# Patient Record
Sex: FEMALE | Race: WHITE | NOT HISPANIC OR LATINO
[De-identification: names, ages, dates, MRNs, and addresses within clinical notes are randomized per-mention and may not be internally consistent; named-entity substitution may affect disease eponyms.]

---

## 2017-01-03 ENCOUNTER — APPOINTMENT (OUTPATIENT)
Dept: ULTRASOUND IMAGING | Facility: HOSPITAL | Age: 72
End: 2017-01-03

## 2017-01-03 ENCOUNTER — OUTPATIENT (OUTPATIENT)
Dept: OUTPATIENT SERVICES | Facility: HOSPITAL | Age: 72
LOS: 1 days | End: 2017-01-03
Payer: MEDICARE

## 2017-01-03 ENCOUNTER — APPOINTMENT (OUTPATIENT)
Dept: ULTRASOUND IMAGING | Facility: IMAGING CENTER | Age: 72
End: 2017-01-03

## 2017-01-03 ENCOUNTER — TRANSCRIPTION ENCOUNTER (OUTPATIENT)
Age: 72
End: 2017-01-03

## 2017-01-03 PROCEDURE — 76700 US EXAM ABDOM COMPLETE: CPT | Mod: 26

## 2017-01-03 PROCEDURE — 76775 US EXAM ABDO BACK WALL LIM: CPT | Mod: 26

## 2017-01-03 PROCEDURE — 76700 US EXAM ABDOM COMPLETE: CPT

## 2017-01-03 PROCEDURE — 76775 US EXAM ABDO BACK WALL LIM: CPT

## 2017-03-21 ENCOUNTER — APPOINTMENT (OUTPATIENT)
Dept: PULMONOLOGY | Facility: CLINIC | Age: 72
End: 2017-03-21

## 2017-03-21 VITALS
WEIGHT: 100 LBS | OXYGEN SATURATION: 99 % | SYSTOLIC BLOOD PRESSURE: 120 MMHG | DIASTOLIC BLOOD PRESSURE: 70 MMHG | RESPIRATION RATE: 14 BRPM | BODY MASS INDEX: 17.07 KG/M2 | HEART RATE: 95 BPM | HEIGHT: 64 IN

## 2017-03-22 ENCOUNTER — OUTPATIENT (OUTPATIENT)
Dept: OUTPATIENT SERVICES | Facility: HOSPITAL | Age: 72
LOS: 1 days | End: 2017-03-22
Payer: MEDICARE

## 2017-03-22 PROCEDURE — 93970 EXTREMITY STUDY: CPT

## 2017-03-22 PROCEDURE — 93880 EXTRACRANIAL BILAT STUDY: CPT

## 2017-03-22 PROCEDURE — 93880 EXTRACRANIAL BILAT STUDY: CPT | Mod: 26

## 2017-03-22 PROCEDURE — 93970 EXTREMITY STUDY: CPT | Mod: 26

## 2017-03-23 LAB
DEPRECATED D DIMER PPP IA-ACNC: <150 NG/ML DDU
NT-PROBNP SERPL-MCNC: 100 PG/ML

## 2017-03-24 ENCOUNTER — RESULT REVIEW (OUTPATIENT)
Age: 72
End: 2017-03-24

## 2017-06-01 ENCOUNTER — OUTPATIENT (OUTPATIENT)
Dept: OUTPATIENT SERVICES | Facility: HOSPITAL | Age: 72
LOS: 1 days | End: 2017-06-01
Payer: MEDICARE

## 2017-06-01 ENCOUNTER — APPOINTMENT (OUTPATIENT)
Dept: CT IMAGING | Facility: HOSPITAL | Age: 72
End: 2017-06-01

## 2017-06-01 PROCEDURE — 71250 CT THORAX DX C-: CPT

## 2017-06-02 ENCOUNTER — RESULT REVIEW (OUTPATIENT)
Age: 72
End: 2017-06-02

## 2017-07-10 ENCOUNTER — APPOINTMENT (OUTPATIENT)
Dept: PULMONOLOGY | Facility: CLINIC | Age: 72
End: 2017-07-10

## 2017-07-10 VITALS
DIASTOLIC BLOOD PRESSURE: 80 MMHG | BODY MASS INDEX: 17.07 KG/M2 | SYSTOLIC BLOOD PRESSURE: 120 MMHG | RESPIRATION RATE: 16 BRPM | HEART RATE: 86 BPM | OXYGEN SATURATION: 100 % | HEIGHT: 64 IN | WEIGHT: 100 LBS

## 2017-07-10 DIAGNOSIS — R06.09 OTHER FORMS OF DYSPNEA: ICD-10-CM

## 2017-07-10 RX ORDER — PREDNISONE 10 MG/1
10 TABLET ORAL
Qty: 1 | Refills: 0 | Status: ACTIVE | COMMUNITY
Start: 2017-07-10 | End: 1900-01-01

## 2017-07-10 RX ORDER — RANITIDINE HCL 75 MG
400-400-40 TABLET ORAL
Qty: 355 | Refills: 0 | Status: ACTIVE | COMMUNITY
Start: 2017-01-02

## 2017-07-17 ENCOUNTER — APPOINTMENT (OUTPATIENT)
Dept: PULMONOLOGY | Facility: CLINIC | Age: 72
End: 2017-07-17

## 2017-07-17 VITALS
BODY MASS INDEX: 17.07 KG/M2 | WEIGHT: 100 LBS | HEART RATE: 94 BPM | DIASTOLIC BLOOD PRESSURE: 65 MMHG | SYSTOLIC BLOOD PRESSURE: 120 MMHG | HEIGHT: 64 IN | RESPIRATION RATE: 14 BRPM | OXYGEN SATURATION: 100 %

## 2017-07-17 RX ORDER — ALBUTEROL SULFATE 2.5 MG/3ML
(2.5 MG/3ML) SOLUTION RESPIRATORY (INHALATION)
Qty: 120 | Refills: 2 | Status: ACTIVE | COMMUNITY
Start: 2017-07-17 | End: 1900-01-01

## 2017-07-17 RX ORDER — BECLOMETHASONE DIPROPIONATE 80 UG/1
80 AEROSOL, METERED RESPIRATORY (INHALATION) TWICE DAILY
Qty: 3 | Refills: 1 | Status: ACTIVE | COMMUNITY
Start: 2017-07-17 | End: 1900-01-01

## 2017-08-14 ENCOUNTER — APPOINTMENT (OUTPATIENT)
Dept: PULMONOLOGY | Facility: CLINIC | Age: 72
End: 2017-08-14

## 2017-10-16 ENCOUNTER — APPOINTMENT (OUTPATIENT)
Dept: PULMONOLOGY | Facility: CLINIC | Age: 72
End: 2017-10-16
Payer: MEDICARE

## 2017-10-16 VITALS
HEIGHT: 63 IN | DIASTOLIC BLOOD PRESSURE: 70 MMHG | HEART RATE: 72 BPM | BODY MASS INDEX: 17.19 KG/M2 | WEIGHT: 97 LBS | SYSTOLIC BLOOD PRESSURE: 116 MMHG | RESPIRATION RATE: 14 BRPM | OXYGEN SATURATION: 99 %

## 2017-10-16 PROCEDURE — 99214 OFFICE O/P EST MOD 30 MIN: CPT | Mod: 25

## 2017-10-16 PROCEDURE — 94010 BREATHING CAPACITY TEST: CPT

## 2017-10-16 RX ORDER — BECLOMETHASONE DIPROPIONATE 40 UG/1
40 AEROSOL, METERED RESPIRATORY (INHALATION)
Qty: 3 | Refills: 1 | Status: ACTIVE | COMMUNITY
Start: 2017-10-16 | End: 1900-01-01

## 2018-01-08 ENCOUNTER — APPOINTMENT (OUTPATIENT)
Dept: PULMONOLOGY | Facility: CLINIC | Age: 73
End: 2018-01-08

## 2018-01-16 ENCOUNTER — OUTPATIENT (OUTPATIENT)
Dept: OUTPATIENT SERVICES | Facility: HOSPITAL | Age: 73
LOS: 1 days | End: 2018-01-16
Payer: MEDICARE

## 2018-01-16 ENCOUNTER — APPOINTMENT (OUTPATIENT)
Dept: MRI IMAGING | Facility: HOSPITAL | Age: 73
End: 2018-01-16
Payer: MEDICARE

## 2018-01-16 DIAGNOSIS — Z00.8 ENCOUNTER FOR OTHER GENERAL EXAMINATION: ICD-10-CM

## 2018-01-16 PROCEDURE — 72141 MRI NECK SPINE W/O DYE: CPT

## 2018-01-16 PROCEDURE — 72141 MRI NECK SPINE W/O DYE: CPT | Mod: 26

## 2018-02-16 ENCOUNTER — APPOINTMENT (OUTPATIENT)
Dept: PULMONOLOGY | Facility: CLINIC | Age: 73
End: 2018-02-16
Payer: MEDICARE

## 2018-02-16 VITALS
RESPIRATION RATE: 17 BRPM | DIASTOLIC BLOOD PRESSURE: 66 MMHG | HEIGHT: 63 IN | OXYGEN SATURATION: 96 % | SYSTOLIC BLOOD PRESSURE: 100 MMHG | WEIGHT: 97 LBS | BODY MASS INDEX: 17.19 KG/M2 | HEART RATE: 76 BPM

## 2018-02-16 PROCEDURE — 94618 PULMONARY STRESS TESTING: CPT

## 2018-02-16 PROCEDURE — 94010 BREATHING CAPACITY TEST: CPT | Mod: 59

## 2018-02-16 PROCEDURE — 99214 OFFICE O/P EST MOD 30 MIN: CPT | Mod: 25

## 2018-02-16 RX ORDER — ALBUTEROL SULFATE 2.5 MG/3ML
(2.5 MG/3ML) SOLUTION RESPIRATORY (INHALATION)
Qty: 120 | Refills: 5 | Status: ACTIVE | OUTPATIENT
Start: 2018-02-16

## 2018-02-16 RX ORDER — AZELASTINE HYDROCHLORIDE 205.5 UG/1
0.15 SPRAY, METERED NASAL TWICE DAILY
Qty: 3 | Refills: 1 | Status: ACTIVE | COMMUNITY
Start: 2018-02-16 | End: 1900-01-01

## 2018-04-16 ENCOUNTER — APPOINTMENT (OUTPATIENT)
Dept: RHEUMATOLOGY | Facility: CLINIC | Age: 73
End: 2018-04-16

## 2018-05-25 ENCOUNTER — NON-APPOINTMENT (OUTPATIENT)
Age: 73
End: 2018-05-25

## 2018-05-25 ENCOUNTER — APPOINTMENT (OUTPATIENT)
Dept: PULMONOLOGY | Facility: CLINIC | Age: 73
End: 2018-05-25
Payer: MEDICARE

## 2018-05-25 VITALS
HEIGHT: 63 IN | DIASTOLIC BLOOD PRESSURE: 60 MMHG | WEIGHT: 97 LBS | OXYGEN SATURATION: 99 % | SYSTOLIC BLOOD PRESSURE: 110 MMHG | HEART RATE: 77 BPM | BODY MASS INDEX: 17.19 KG/M2

## 2018-05-25 PROCEDURE — 99214 OFFICE O/P EST MOD 30 MIN: CPT | Mod: 25

## 2018-05-25 PROCEDURE — 95012 NITRIC OXIDE EXP GAS DETER: CPT

## 2018-05-25 PROCEDURE — 94010 BREATHING CAPACITY TEST: CPT

## 2018-05-25 RX ORDER — SUCRALFATE 1 G/1
1 TABLET ORAL
Qty: 360 | Refills: 0 | Status: ACTIVE | COMMUNITY
Start: 2018-05-11

## 2018-05-25 RX ORDER — LOSARTAN POTASSIUM 50 MG/1
50 TABLET, FILM COATED ORAL
Qty: 180 | Refills: 0 | Status: ACTIVE | COMMUNITY
Start: 2018-04-21

## 2018-06-11 ENCOUNTER — APPOINTMENT (OUTPATIENT)
Dept: CT IMAGING | Facility: HOSPITAL | Age: 73
End: 2018-06-11
Payer: MEDICARE

## 2018-06-11 ENCOUNTER — OUTPATIENT (OUTPATIENT)
Dept: OUTPATIENT SERVICES | Facility: HOSPITAL | Age: 73
LOS: 1 days | End: 2018-06-11
Payer: MEDICARE

## 2018-06-11 DIAGNOSIS — R93.8 ABNORMAL FINDINGS ON DIAGNOSTIC IMAGING OF OTHER SPECIFIED BODY STRUCTURES: ICD-10-CM

## 2018-06-11 PROCEDURE — 71250 CT THORAX DX C-: CPT | Mod: 26

## 2018-06-11 PROCEDURE — 71250 CT THORAX DX C-: CPT

## 2018-08-06 ENCOUNTER — RESULT REVIEW (OUTPATIENT)
Age: 73
End: 2018-08-06

## 2018-09-25 ENCOUNTER — APPOINTMENT (OUTPATIENT)
Dept: PULMONOLOGY | Facility: CLINIC | Age: 73
End: 2018-09-25

## 2018-10-29 ENCOUNTER — APPOINTMENT (OUTPATIENT)
Dept: PULMONOLOGY | Facility: CLINIC | Age: 73
End: 2018-10-29
Payer: MEDICARE

## 2018-10-29 ENCOUNTER — NON-APPOINTMENT (OUTPATIENT)
Age: 73
End: 2018-10-29

## 2018-10-29 VITALS
WEIGHT: 95 LBS | SYSTOLIC BLOOD PRESSURE: 96 MMHG | OXYGEN SATURATION: 93 % | DIASTOLIC BLOOD PRESSURE: 60 MMHG | BODY MASS INDEX: 16.22 KG/M2 | RESPIRATION RATE: 17 BRPM | HEIGHT: 64 IN | HEART RATE: 80 BPM

## 2018-10-29 PROCEDURE — 94010 BREATHING CAPACITY TEST: CPT

## 2018-10-29 PROCEDURE — 99214 OFFICE O/P EST MOD 30 MIN: CPT | Mod: 25

## 2018-10-29 NOTE — PHYSICAL EXAM
[General Appearance - Well Developed] : well developed [Normal Appearance] : normal appearance [Well Groomed] : well groomed [General Appearance - Well Nourished] : well nourished [No Deformities] : no deformities [General Appearance - In No Acute Distress] : no acute distress [Normal Conjunctiva] : the conjunctiva exhibited no abnormalities [Eyelids - No Xanthelasma] : the eyelids demonstrated no xanthelasmas [Normal Oropharynx] : normal oropharynx [II] : II [Neck Appearance] : the appearance of the neck was normal [Neck Cervical Mass (___cm)] : no neck mass was observed [Jugular Venous Distention Increased] : there was no jugular-venous distention [Thyroid Diffuse Enlargement] : the thyroid was not enlarged [Thyroid Nodule] : there were no palpable thyroid nodules [Heart Rate And Rhythm] : heart rate and rhythm were normal [Heart Sounds] : normal S1 and S2 [Murmurs] : no murmurs present [Respiration, Rhythm And Depth] : normal respiratory rhythm and effort [Exaggerated Use Of Accessory Muscles For Inspiration] : no accessory muscle use [Auscultation Breath Sounds / Voice Sounds] : lungs were clear to auscultation bilaterally [Abdomen Soft] : soft [Abdomen Tenderness] : non-tender [Abdomen Mass (___ Cm)] : no abdominal mass palpated [Abnormal Walk] : normal gait [Gait - Sufficient For Exercise Testing] : the gait was sufficient for exercise testing [Nail Clubbing] : no clubbing of the fingernails [Cyanosis, Localized] : no localized cyanosis [Petechial Hemorrhages (___cm)] : no petechial hemorrhages [Skin Color & Pigmentation] : normal skin color and pigmentation [] : no rash [No Venous Stasis] : no venous stasis [Skin Lesions] : no skin lesions [No Skin Ulcers] : no skin ulcer [No Xanthoma] : no  xanthoma was observed [Deep Tendon Reflexes (DTR)] : deep tendon reflexes were 2+ and symmetric [Sensation] : the sensory exam was normal to light touch and pinprick [No Focal Deficits] : no focal deficits [Oriented To Time, Place, And Person] : oriented to person, place, and time [Impaired Insight] : insight and judgment were intact [Affect] : the affect was normal [FreeTextEntry1] : I:E 1:3; clear

## 2018-10-29 NOTE — REASON FOR VISIT
[Follow-Up] : a follow-up visit [FreeTextEntry1] : abnormal chest CT, allergies, asthma, GERD, ESTELLE, and poor sleep

## 2018-10-29 NOTE — ASSESSMENT
[FreeTextEntry1] : Ms. Rodriguez is a 72 year old female with a history of asthma, allergies, GERD, and poor sleep.\par Her SOB is due to: \par -CAD or arrhythmias (she is to see her cardiologist) \par -asthma\par -anemia \par \par problem 1: moderate persistent asthma\par -continue Nebulizer with Albuterol QD, prn \par -continue Advair 500 1 puff BID \par -followed by Spiriva 1 inhalation QD\par -continue Qvar 80 2 puffs BID prn \par -continue to use Singulair 10 mg BID\par -Asthma is  believed to be caused by inherited (genetic) and environmental factor, but its exact cause is unknown. Asthma may be triggered by allergens, lung infections, or irritants in the air. Asthma triggers are different for each person \par -Inhaler technique reviewed as well as oral hygiene techniques reviewed with patient. Avoidance of cold air, extremes of temperature, rescue inhaler should be used before exercise. Order of medication reviewed with patient. Recommended use of a cool mist humidifier in the bedroom. \par \par problem 2: allergies and sinuses\par -continue to use Zyrtec 10 mg before bed\par -continue to use nasal saline\par -continue Astelin 0.15% at 1 sniff/nostril BID \par -Appointment with Dr. Otero\par -Environmental measures for allergies were encouraged including mattress and pillow cover, air purifier, and environmental controls. \par \par problem 3: GERD\par -continue to use Zantac 150 mg BID\par -Rule of 2s: avoid eating too much, eating too late, eating too spicy, eating two hours before bed\par -Things to avoid including overeating, spicy foods, tight clothing, eating within three hours of bed, this list is not all inclusive. \par -For treatment of reflux, possible options discussed including diet control, H2 blockers, PPIs, as well as coating motility agents discussed as treatment options. Timing of meals and proximity of last meal to sleep were discussed. If symptoms persist, a formal gastrointestinal evaluation is needed. \par \par problem 4: low vitamin D level\par -continue to use supplemental vitamin D\par - Has been associated with asthma exacerbations and increased allergic symptoms. The goal based on recent information is maintaining levels between 50-70 and low normal is 30. Recommended 50,000 units every two weeks to once a month depending on the level. \par \par problem 5: poor sleep and leg pains \par -she is being recommended to try MyoCalm PM \par \par problem 6: history of an abnormal chest CT\par -follow up chest CT in June 2019 \par \par problem 7: cardiac component\par -continue to f/u with Dr. Swanson\par -pending stress echocardiogram\par \par Problem 8: r/o OSAS\par -she is to have a home sleep study to r/o OSAS based on her fatigue, EDS, snoring, and elevated mallampati class\par -recommended Sleep Guard by nedra \par - -Discussed the risks/associations with coronary artery disease, atrial fibrillation, arrhythmia, memory loss, issues with concentration, stroke risk, hypertension, nocturia, chronic reflux/Dumont’s esophagus some but not all inclusive. Treatment options discussed including CPAP/BiPAP machine, oral appliance, ProVent therapy, Oxy-Aid by Respitec, new technologies, or positional sleep. \par \par problem 9: health maintenance \par -recommended IBS Guard \par -recommended yearly flu shot after October 15\par -recommended strep pneumonia vaccines: Prevnar-13 vaccine, followed by Pneumo vaccine 23 one year following\par -recommended early intervention for URIs\par -recommended regular osteoporosis evaluations\par -recommended early dermatological evaluations\par -recommended after the age of 50 to the age of 70, colonoscopy every 5 years \par \par F/U in 3-4 months\par She is encouraged to call with any changes, concerns, or questions

## 2018-10-29 NOTE — PROCEDURE
[FreeTextEntry1] : PFT- spi reveals normal flows; FEV1 was 2.08L which is 96% of predicted; normal flow volume loop \par \par She had a CT chest scan performed on 6/12/2018, which showed right lower lobe 3 mm groundglass nodule that is new from the prior study. A few other bilateral 3 mm nodule that are unchanged. Three month follow up CT recommended.

## 2018-10-29 NOTE — HISTORY OF PRESENT ILLNESS
[FreeTextEntry1] : Ms. Rodriguez is a 72 year old female presenting to the office today for a follow up visit for abnormal chest CT, allergies, asthma, GERD, ESTELLE, and poor sleep. Her chief complaint is very poor sleep. \par - She comes in stating that she is having a lot of difficulty with her sleeping/\par - She states that her sleep is terrible. She is waking up every 1-1.5 hours.\par - She has severe anxiety. She believes that her sleeping is secondary to her anxiety. She is moving to [place] and is having a lot anxiety about the move. \par - She notes occasional SOB upon exertion (stairs, washing the floor)\par - She states that if she gets 4 hours of sleep that is a lot. \par - She states that she lost weight, which is normal for her during the summer\par - She has a history of issues with her back. She states that it has recently worsened.

## 2018-10-29 NOTE — ADDENDUM
[FreeTextEntry1] : Documented by Lavon Tobin acting as a scribe for Dr. Ady Emmanuel on 10/29/18\par \par All medical record entries made by the Scribe were at my, Dr. Ady Emmanuel's, direction and personally dictated by me on 10/29/18. I have reviewed the chart and agree that the record accurately reflects my personal performance of the history, physical exam, assessment and plan. I have also personally directed, reviewed, and agree with the discharge instructions. \par \par \par \par \par

## 2019-01-09 ENCOUNTER — APPOINTMENT (OUTPATIENT)
Dept: PULMONOLOGY | Facility: CLINIC | Age: 74
End: 2019-01-09
Payer: MEDICARE

## 2019-01-09 ENCOUNTER — MEDICATION RENEWAL (OUTPATIENT)
Age: 74
End: 2019-01-09

## 2019-01-09 VITALS
RESPIRATION RATE: 14 BRPM | DIASTOLIC BLOOD PRESSURE: 60 MMHG | SYSTOLIC BLOOD PRESSURE: 110 MMHG | HEART RATE: 82 BPM | WEIGHT: 96 LBS | HEIGHT: 64 IN | OXYGEN SATURATION: 100 % | BODY MASS INDEX: 16.39 KG/M2

## 2019-01-09 DIAGNOSIS — R07.89 OTHER CHEST PAIN: ICD-10-CM

## 2019-01-09 PROCEDURE — 94640 AIRWAY INHALATION TREATMENT: CPT

## 2019-01-09 PROCEDURE — 71046 X-RAY EXAM CHEST 2 VIEWS: CPT

## 2019-01-09 PROCEDURE — 99214 OFFICE O/P EST MOD 30 MIN: CPT | Mod: 25

## 2019-01-09 RX ORDER — ALBUTEROL SULFATE 2.5 MG/3ML
(2.5 MG/3ML) SOLUTION RESPIRATORY (INHALATION)
Qty: 120 | Refills: 3 | Status: ACTIVE | OUTPATIENT
Start: 2019-01-09

## 2019-01-09 RX ORDER — PREDNISONE 10 MG/1
10 TABLET ORAL
Qty: 50 | Refills: 0 | Status: ACTIVE | COMMUNITY
Start: 2019-01-09 | End: 1900-01-01

## 2019-01-15 RX ORDER — BUDESONIDE 0.5 MG/2ML
0.5 INHALANT ORAL
Qty: 2 | Refills: 1 | Status: ACTIVE | COMMUNITY
Start: 2019-01-15 | End: 1900-01-01

## 2019-01-15 RX ORDER — PREDNISONE 10 MG/1
10 TABLET ORAL
Qty: 21 | Refills: 0 | Status: ACTIVE | COMMUNITY
Start: 2019-01-15 | End: 1900-01-01

## 2019-01-17 NOTE — PHYSICAL EXAM
[General Appearance - Well Developed] : well developed [Normal Appearance] : normal appearance [Well Groomed] : well groomed [General Appearance - Well Nourished] : well nourished [No Deformities] : no deformities [General Appearance - In No Acute Distress] : no acute distress [Normal Conjunctiva] : the conjunctiva exhibited no abnormalities [Eyelids - No Xanthelasma] : the eyelids demonstrated no xanthelasmas [Normal Oropharynx] : normal oropharynx [III] : III [Neck Appearance] : the appearance of the neck was normal [Neck Cervical Mass (___cm)] : no neck mass was observed [Jugular Venous Distention Increased] : there was no jugular-venous distention [Thyroid Diffuse Enlargement] : the thyroid was not enlarged [Thyroid Nodule] : there were no palpable thyroid nodules [Heart Rate And Rhythm] : heart rate and rhythm were normal [Heart Sounds] : normal S1 and S2 [Murmurs] : no murmurs present [Respiration, Rhythm And Depth] : normal respiratory rhythm and effort [Exaggerated Use Of Accessory Muscles For Inspiration] : no accessory muscle use [Auscultation Breath Sounds / Voice Sounds] : lungs were clear to auscultation bilaterally [Abdomen Soft] : soft [Abdomen Tenderness] : non-tender [Abdomen Mass (___ Cm)] : no abdominal mass palpated [Abnormal Walk] : normal gait [Gait - Sufficient For Exercise Testing] : the gait was sufficient for exercise testing [Nail Clubbing] : no clubbing of the fingernails [Cyanosis, Localized] : no localized cyanosis [Petechial Hemorrhages (___cm)] : no petechial hemorrhages [Skin Color & Pigmentation] : normal skin color and pigmentation [] : no rash [No Venous Stasis] : no venous stasis [Skin Lesions] : no skin lesions [No Skin Ulcers] : no skin ulcer [No Xanthoma] : no  xanthoma was observed [Deep Tendon Reflexes (DTR)] : deep tendon reflexes were 2+ and symmetric [Sensation] : the sensory exam was normal to light touch and pinprick [No Focal Deficits] : no focal deficits [Oriented To Time, Place, And Person] : oriented to person, place, and time [Impaired Insight] : insight and judgment were intact [Affect] : the affect was normal [FreeTextEntry1] : I:E 1:3; clear

## 2019-01-17 NOTE — PROCEDURE
[FreeTextEntry1] : CXR reveals a normal sized heart; hyperinflation bilaterally - questionable inflammatory changes left mid lung however not seen on lateral chest radiograph. \par \par Chest CT  06.11.18 reveals right lower lobe 3mm groundglass nodule is new from the prior study. A few other bilateral 3mm nodules unchanged. Three-month follow-up CT recommended.

## 2019-01-17 NOTE — REASON FOR VISIT
[Follow-Up] : a follow-up visit [FreeTextEntry1] : sick visit abnormal chest CT, allergies, asthma, GERD, ESTELLE, and poor sleep

## 2019-01-17 NOTE — REVIEW OF SYSTEMS
[Negative] : Sleep Disorder [As Noted in HPI] : as noted in HPI [Dyspnea] : dyspnea [FreeTextEntry8] : chest pressure

## 2019-01-17 NOTE — ADDENDUM
[FreeTextEntry1] : Documented by Shankar Jaquez acting as a scribe for Dr. Ady Emmanuel on 01/09/2019.\par \par All medical record entries made by the Scribe were at my, Dr. Ady Emmanuel's, direction and personally dictated by me on 01/09/2019. I have reviewed the chart and agree that the record accurately reflects my personal performance of the history, physical exam, assessment and plan. I have also personally directed, reviewed, and agree with the discharge instructions.

## 2019-01-17 NOTE — HISTORY OF PRESENT ILLNESS
[FreeTextEntry1] : Ms. Rodriguez is a 72 year old female presenting to the office today for a follow up visit for abnormal chest CT, allergies, asthma, GERD, ESTELLE, and poor sleep. Her chief complaint is chest pressure\par -she states she has never had breast surgery\par -she notes her symptoms started 3 weeks ago\par -she notes she had aches, nausea, loss off appetite, difficulty breathing, congested, and chest discomfort with walks.\par -she states she finished Zpack 2 days ago, which did not alleviate any symptoms\par -she states she is currently coughing\par -she states she recently saw Dr. See and negative for influenza\par -she notes she has been compliant with inhalers\par -she denies any wheezing, headaches, nausea, vomiting, fever, chills, sweats, chest pain, chest pressure, diarrhea, constipation, dysphagia, dizziness, leg swelling, leg pain, itchy eyes, itchy ears, heartburn, reflux, or sour taste in the mouth.

## 2019-02-20 ENCOUNTER — APPOINTMENT (OUTPATIENT)
Dept: CT IMAGING | Facility: HOSPITAL | Age: 74
End: 2019-02-20

## 2019-02-28 ENCOUNTER — APPOINTMENT (OUTPATIENT)
Dept: PULMONOLOGY | Facility: CLINIC | Age: 74
End: 2019-02-28
Payer: MEDICARE

## 2019-02-28 ENCOUNTER — NON-APPOINTMENT (OUTPATIENT)
Age: 74
End: 2019-02-28

## 2019-02-28 VITALS
HEIGHT: 64 IN | WEIGHT: 97 LBS | OXYGEN SATURATION: 98 % | SYSTOLIC BLOOD PRESSURE: 90 MMHG | BODY MASS INDEX: 16.56 KG/M2 | DIASTOLIC BLOOD PRESSURE: 60 MMHG | RESPIRATION RATE: 17 BRPM | TEMPERATURE: 99.6 F | HEART RATE: 79 BPM

## 2019-02-28 DIAGNOSIS — J45.901 ACUTE BRONCHITIS, UNSPECIFIED: ICD-10-CM

## 2019-02-28 DIAGNOSIS — J20.9 ACUTE BRONCHITIS, UNSPECIFIED: ICD-10-CM

## 2019-02-28 PROCEDURE — 94010 BREATHING CAPACITY TEST: CPT

## 2019-02-28 PROCEDURE — 95012 NITRIC OXIDE EXP GAS DETER: CPT

## 2019-02-28 PROCEDURE — 99214 OFFICE O/P EST MOD 30 MIN: CPT | Mod: 25

## 2019-02-28 RX ORDER — SPIRONOLACTONE 50 MG/1
50 TABLET ORAL
Qty: 90 | Refills: 0 | Status: ACTIVE | COMMUNITY
Start: 2019-01-09

## 2019-02-28 RX ORDER — DESVENLAFAXINE 25 MG/1
25 TABLET, EXTENDED RELEASE ORAL
Qty: 30 | Refills: 0 | Status: ACTIVE | COMMUNITY
Start: 2019-01-18

## 2019-02-28 RX ORDER — METOPROLOL SUCCINATE 25 MG/1
25 TABLET, EXTENDED RELEASE ORAL
Qty: 30 | Refills: 0 | Status: ACTIVE | COMMUNITY
Start: 2019-01-28

## 2019-02-28 RX ORDER — CLONAZEPAM 0.12 MG/1
0.12 TABLET, ORALLY DISINTEGRATING ORAL
Qty: 30 | Refills: 0 | Status: ACTIVE | COMMUNITY
Start: 2019-01-30

## 2019-02-28 RX ORDER — METOPROLOL SUCCINATE 50 MG/1
50 TABLET, EXTENDED RELEASE ORAL
Qty: 90 | Refills: 0 | Status: ACTIVE | COMMUNITY
Start: 2019-02-04

## 2019-02-28 RX ORDER — CLONAZEPAM 0.5 MG/1
0.5 TABLET ORAL
Qty: 60 | Refills: 0 | Status: ACTIVE | COMMUNITY
Start: 2019-01-28

## 2019-02-28 RX ORDER — CHLORHEXIDINE GLUCONATE, 0.12% ORAL RINSE 1.2 MG/ML
0.12 SOLUTION DENTAL
Qty: 946 | Refills: 0 | Status: ACTIVE | COMMUNITY
Start: 2019-02-18

## 2019-02-28 RX ORDER — SUCRALFATE 1 G/10ML
1 SUSPENSION ORAL
Qty: 1200 | Refills: 0 | Status: ACTIVE | COMMUNITY
Start: 2019-01-31

## 2019-02-28 RX ORDER — TRAZODONE HYDROCHLORIDE 150 MG/1
150 TABLET ORAL
Qty: 60 | Refills: 0 | Status: ACTIVE | COMMUNITY
Start: 2019-02-15

## 2019-02-28 NOTE — REASON FOR VISIT
[Follow-Up] : a follow-up visit [FreeTextEntry1] : abnormal lung CT, allergic rhinitis, asthma, PINTO, GERD, lung nodules, ESTELLE, poor sleep and SOB

## 2019-02-28 NOTE — ADDENDUM
[FreeTextEntry1] : All medical record entries made by suhas Lawrence were at Dr. Ady Emmanule's, direction and personally dictated by me on 02/28/2019. I have reviewed the chart and agree that the record accurately reflects my personal performance of the history, physical exam, assessment and plan. I have also personally directed, reviewed, and agree with the discharge instructions.

## 2019-02-28 NOTE — PROCEDURE
[FreeTextEntry1] : PFT - spi reveals normal flows; FEV1 is 2.16 which is 101% of predicted, normal flow volume loop \par \par FENO was 7; a normal value being less than 25\par \par Fractional exhaled nitric oxide (FENO) is regarded as a simple, noninvasive method for assessing eosinophilic airway inflammation. Produced by a variety of cells within the lung, nitric oxide (NO) concentrations are generally low in healthy individuals. However, high concentrations of NO appear to be involved in nonspecific host defense mechanisms and chronic inflammatory diseases such as asthma. The American Thoracic Society (ATS) therefore has recommended using FENO to aid in the diagnosis and monitoring of eosinophilic airway inflammation and asthma, and for identifying steroid responsive individuals whose chronic respiratory symptoms may be caused by airway inflammation.

## 2019-02-28 NOTE — ASSESSMENT
[FreeTextEntry1] : Ms. Rodriguez is a 72 year old female with a history of asthma, allergies, GERD, poor sleep and abnormal CT presenting under stress due to moving houses. \par \par Her SOB is due to: \par -CAD or arrhythmias (she is to see her cardiologist) \par -asthma (active )\par -anemia \par \par problem 1: asthma\par -continue Nebulizer with Budesonide 0.5% BID, Q6H - treatment given today in office\par -continue Advair 500 1 puff BID \par -followed by Spiriva 1 inhalation QD\par -continue Qvar 80 2 puffs BID prn \par -continue to use Singulair 10 mg BID\par \par -Asthma is  believed to be caused by inherited (genetic) and environmental factor, but its exact cause is unknown. Asthma may be triggered by allergens, lung infections, or irritants in the air. Asthma triggers are different for each person \par -Inhaler technique reviewed as well as oral hygiene techniques reviewed with patient. Avoidance of cold air, extremes of temperature, rescue inhaler should be used before exercise. Order of medication reviewed with patient. Recommended use of a cool mist humidifier in the bedroom. \par - Information sheet given to the patient to be reviewed, this medication is never to be used without\par consulting the prescribing physician. Proper dietary restraint is necessary specifically salt containing\par foods, if any reaction may occur should be reported.  \par \par problem 2: allergy sinus\par -ENT f/u with Dr. Otero\par -continue to use Zyrtec 10 mg before bed\par -continue to use nasal saline\par -continue Astelin 0.15% at 1 sniff/nostril BID \par -Appointment with Dr. Otero\par -Environmental measures for allergies were encouraged including mattress and pillow cover, air purifier, and environmental controls. \par \par problem 3: GERD\par -continue to use Zantac 150 mg BID\par -recommended to use Organic Apple Cider Vinegar Tablets \par -recommended to continue to follow up with gastroenterologists - Dr. Wes Brar & Dr. ARTURO Swanson\par \par -Rule of 2s: avoid eating too much, eating too late, eating too spicy, eating two hours before bed\par -Things to avoid including overeating, spicy foods, tight clothing, eating within three hours of bed, this list is not all inclusive. \par -For treatment of reflux, possible options discussed including diet control, H2 blockers, PPIs, as well as coating motility agents discussed as treatment options. Timing of meals and proximity of last meal to sleep were discussed. If symptoms persist, a formal gastrointestinal evaluation is needed. \par \par problem 4: low vitamin D level\par -continue to use supplemental vitamin D\par - Has been associated with asthma exacerbations and increased allergic symptoms. The goal based on recent information is maintaining levels between 50-70 and low normal is 30. Recommended 50,000 units every two weeks to once a month depending on the level. \par \par problem 5: poor sleep and leg pains \par -she is being recommended to try MyoCalm PM \par \par problem 6: history of an abnormal chest CT (nodule) - new\par -follow up chest CT now (next 3/19\par \par problem 7: cardiac component\par -continue to f/u with Dr. Swanson\par -pending stress echocardiogram\par \par Problem 8: r/o OSAS\par -she is to have a home sleep study to r/o OSAS based on her fatigue, EDS, snoring, and elevated mallampati class\par -recommended Sleep Guard by nedra \par - -Discussed the risks/associations with coronary artery disease, atrial fibrillation, arrhythmia, memory loss, issues with concentration, stroke risk, hypertension, nocturia, chronic reflux/Dumont’s esophagus some but not all inclusive. Treatment options discussed including CPAP/BiPAP machine, oral appliance, ProVent therapy, Oxy-Aid by Respitec, new technologies, or positional sleep. \par \par problem 9: health maintenance \par -recommended IBS Guard \par -recommended yearly flu shot after October 15\par -recommended strep pneumonia vaccines: Prevnar-13 vaccine, followed by Pneumo vaccine 23 one year following\par -recommended early intervention for URIs\par -recommended regular osteoporosis evaluations\par -recommended early dermatological evaluations\par -recommended after the age of 50 to the age of 70, colonoscopy every 5 years \par \par F/U in 3-4 months\par She is encouraged to call with any changes, concerns, or questions

## 2019-02-28 NOTE — HISTORY OF PRESENT ILLNESS
[FreeTextEntry1] : Ms. Rodriguez is a 73 year old female with a history of abnormal lung CT, allergic rhinitis, asthma, PINTO, GERD, lung nodules, ESTELLE, poor sleep and SOB presenting to the office today for a follow up visit. Her chief complaint is \par -she states that she has been feeling very overwhelmed as she is currently moving \par -she notes that her energy level has been very low\par -she states that the cold air bothers her breathing, and she has been getting occasional chest pain/pressure. she has not been using her nebulizer\par -she notes that she has been getting heartburn which she feels is not currently under control. she has been following up with her GI doctors. \par -she reports that she sleeps very poorly, getting 5-6 hours on a good night and uses sleep aid medication\par -she denies any headaches, nausea, vomiting, fever, chills, sweats, diarrhea, constipation, dysphagia, dizziness, leg swelling, leg pain, itchy eyes, itchy ears, or sour taste in the mouth.

## 2019-03-04 ENCOUNTER — APPOINTMENT (OUTPATIENT)
Dept: CT IMAGING | Facility: HOSPITAL | Age: 74
End: 2019-03-04
Payer: MEDICARE

## 2019-03-04 ENCOUNTER — OUTPATIENT (OUTPATIENT)
Dept: OUTPATIENT SERVICES | Facility: HOSPITAL | Age: 74
LOS: 1 days | End: 2019-03-04
Payer: MEDICARE

## 2019-03-04 DIAGNOSIS — R91.8 OTHER NONSPECIFIC ABNORMAL FINDING OF LUNG FIELD: ICD-10-CM

## 2019-03-04 PROCEDURE — 71250 CT THORAX DX C-: CPT | Mod: 26

## 2019-03-04 PROCEDURE — 71250 CT THORAX DX C-: CPT

## 2019-03-25 ENCOUNTER — RX RENEWAL (OUTPATIENT)
Age: 74
End: 2019-03-25

## 2019-04-25 ENCOUNTER — APPOINTMENT (OUTPATIENT)
Dept: RADIOLOGY | Facility: HOSPITAL | Age: 74
End: 2019-04-25

## 2019-04-25 ENCOUNTER — OUTPATIENT (OUTPATIENT)
Dept: OUTPATIENT SERVICES | Facility: HOSPITAL | Age: 74
LOS: 1 days | End: 2019-04-25
Payer: MEDICARE

## 2019-04-25 DIAGNOSIS — Z00.00 ENCOUNTER FOR GENERAL ADULT MEDICAL EXAMINATION WITHOUT ABNORMAL FINDINGS: ICD-10-CM

## 2019-04-25 DIAGNOSIS — K21.9 GASTRO-ESOPHAGEAL REFLUX DISEASE WITHOUT ESOPHAGITIS: ICD-10-CM

## 2019-04-25 PROCEDURE — 74220 X-RAY XM ESOPHAGUS 1CNTRST: CPT

## 2019-05-09 ENCOUNTER — APPOINTMENT (OUTPATIENT)
Dept: GASTROENTEROLOGY | Facility: CLINIC | Age: 74
End: 2019-05-09
Payer: MEDICARE

## 2019-05-09 VITALS
WEIGHT: 96 LBS | BODY MASS INDEX: 16.39 KG/M2 | SYSTOLIC BLOOD PRESSURE: 132 MMHG | HEIGHT: 64 IN | TEMPERATURE: 98.3 F | DIASTOLIC BLOOD PRESSURE: 70 MMHG | OXYGEN SATURATION: 98 % | HEART RATE: 82 BPM

## 2019-05-09 DIAGNOSIS — R19.4 CHANGE IN BOWEL HABIT: ICD-10-CM

## 2019-05-09 DIAGNOSIS — K58.9 IRRITABLE BOWEL SYNDROME W/OUT DIARRHEA: ICD-10-CM

## 2019-05-09 DIAGNOSIS — K63.89 OTHER SPECIFIED DISEASES OF INTESTINE: ICD-10-CM

## 2019-05-09 DIAGNOSIS — R13.10 DYSPHAGIA, UNSPECIFIED: ICD-10-CM

## 2019-05-09 DIAGNOSIS — R14.0 ABDOMINAL DISTENSION (GASEOUS): ICD-10-CM

## 2019-05-09 PROCEDURE — 99205 OFFICE O/P NEW HI 60 MIN: CPT

## 2019-05-09 RX ORDER — PERPHENAZINE 8 MG
50 TABLET ORAL
Qty: 90 | Refills: 0 | Status: ACTIVE | COMMUNITY
Start: 2019-05-09 | End: 1900-01-01

## 2019-05-09 RX ORDER — RIFAXIMIN 550 MG/1
550 TABLET ORAL TWICE DAILY
Qty: 20 | Refills: 0 | Status: ACTIVE | COMMUNITY
Start: 2019-05-09 | End: 1900-01-01

## 2019-05-14 ENCOUNTER — OTHER (OUTPATIENT)
Age: 74
End: 2019-05-14

## 2019-05-14 PROBLEM — R19.4 ALTERED BOWEL HABITS: Status: ACTIVE | Noted: 2019-05-14

## 2019-05-14 NOTE — ASSESSMENT
[FreeTextEntry1] : 74 yo F pmh asthma, anxiety/depression, ? malnutrition who was referred by AYESHA AVILES for second opinion for multiple GI Issues.  As the patient is moving to [place] in 3-4 weeks, we are limited in what we can offer at this time.  I explained to the patient that given her persistent symptoms of dysphagia, bloating, and altered bowel habits - that a motility workup is reasonable.  If this is negative, similar to previous testing, it is reasonable to be more aggressive at treating functional GI disorders as this is in the differential for her symptoms.  I think the following would be a reasonable approach:\par \par - EGD with Endoflip to assess esophageal motility for her upper GI and slow transit symptoms\par - SmartPill study to assess overall motility given bloating, fullness, altered bowel habits\par - Trial of Peppermint Oil for her post prandial bloating (Would not need monitoring on this medication as is OTC)\par - Trial of strict Low-FODMAP diet\par - More concerted effort at treating her anxiety with longer acting medications (i.e. not benzo) with her psychiatric team\par - Pending the above, her future GI physicians can consider more aggressive treatments for functional disorders\par \par In addition to the above, I have encouraged her to make an appt with [facility] [place] as this is the major [facility] closest to where she is moving.  She should make this appt now in order to see a GI physician to establish care with soon after her arrival given how disruptive her GI symptoms are.\par \par I spent over 60 minutes with this patient and her  in consultation reviewing all of the above at length.  Answered all questions regarding the above.\par She will continue to follow with her primary GI prior to moving and will establish care with GI locally afterward\par

## 2019-05-14 NOTE — REVIEW OF SYSTEMS
[Chills] : no chills [Fever] : no fever [Feeling Poorly] : feeling poorly [Feeling Tired] : feeling tired [Recent Weight Loss (___ Lbs)] : no recent weight loss [Red Eyes] : eyes not red [Eye Pain] : no eye pain [Earache] : no earache [Sore Throat] : no sore throat [Hoarseness] : no hoarseness [Chest Pain] : no chest pain [Heart Rate Is Fast] : the heart rate was not fast [Palpitations] : no palpitations [As Noted in HPI] : as noted in HPI [Cough] : no cough [Joint Pain] : no joint pain [Arthralgias] : no arthralgias [Skin Lesions] : no skin lesions [Joint Swelling] : no joint swelling [Skin Wound] : no skin wound [Confused] : no confusion [Itching] : no itching [Convulsions] : no convulsions [Difficulty Walking] : no difficulty walking [Limb Weakness] : no limb weakness [Anxiety] : anxiety [Depression] : depression [Easy Bleeding] : no tendency for easy bleeding [Muscle Weakness] : no muscle weakness [Feelings Of Weakness] : no feelings of weakness [Swollen Glands] : no swollen glands [Easy Bruising] : no tendency for easy bruising [Swollen Glands In The Neck] : no swollen glands in the neck

## 2019-05-14 NOTE — PHYSICAL EXAM
[General Appearance - Alert] : alert [Extraocular Movements] : extraocular movements were intact [Sclera] : the sclera and conjunctiva were normal [General Appearance - In No Acute Distress] : in no acute distress [Examination Of The Oral Cavity] : the lips and gums were normal [Outer Ear] : the ears and nose were normal in appearance [Neck Appearance] : the appearance of the neck was normal [Oropharynx] : the oropharynx was normal [Neck Cervical Mass (___cm)] : no neck mass was observed [Thyroid Diffuse Enlargement] : the thyroid was not enlarged [Thyroid Nodule] : there were no palpable thyroid nodules [Exaggerated Use Of Accessory Muscles For Inspiration] : no accessory muscle use [Auscultation Breath Sounds / Voice Sounds] : lungs were clear to auscultation bilaterally [Heart Rate And Rhythm] : heart rate was normal and rhythm regular [Murmurs] : no murmurs [Heart Sounds] : normal S1 and S2 [Edema] : there was no peripheral edema [Bowel Sounds] : normal bowel sounds [Abdomen Tenderness] : non-tender [Cervical Lymph Nodes Enlarged Anterior Bilaterally] : anterior cervical [Cervical Lymph Nodes Enlarged Posterior Bilaterally] : posterior cervical [Abdomen Mass (___ Cm)] : no abdominal mass palpated [No CVA Tenderness] : no ~M costovertebral angle tenderness [Supraclavicular Lymph Nodes Enlarged Bilaterally] : supraclavicular [Involuntary Movements] : no involuntary movements were seen [No Spinal Tenderness] : no spinal tenderness [Skin Color & Pigmentation] : normal skin color and pigmentation [Abnormal Walk] : normal gait [] : no rash [No Focal Deficits] : no focal deficits [Impaired Insight] : insight and judgment were intact [FreeTextEntry1] : aox3 [Affect] : the affect was normal

## 2019-05-29 ENCOUNTER — APPOINTMENT (OUTPATIENT)
Dept: RADIOLOGY | Facility: HOSPITAL | Age: 74
End: 2019-05-29
Payer: MEDICARE

## 2019-05-29 ENCOUNTER — OUTPATIENT (OUTPATIENT)
Dept: OUTPATIENT SERVICES | Facility: HOSPITAL | Age: 74
LOS: 1 days | End: 2019-05-29
Payer: MEDICARE

## 2019-05-29 DIAGNOSIS — M17.9 OSTEOARTHRITIS OF KNEE, UNSPECIFIED: ICD-10-CM

## 2019-05-29 DIAGNOSIS — M70.50 OTHER BURSITIS OF KNEE, UNSPECIFIED KNEE: ICD-10-CM

## 2019-05-29 DIAGNOSIS — M76.50 PATELLAR TENDINITIS, UNSPECIFIED KNEE: ICD-10-CM

## 2019-05-29 PROCEDURE — 73562 X-RAY EXAM OF KNEE 3: CPT | Mod: 26,50

## 2019-05-29 PROCEDURE — 73562 X-RAY EXAM OF KNEE 3: CPT

## 2019-06-03 ENCOUNTER — FORM ENCOUNTER (OUTPATIENT)
Age: 74
End: 2019-06-03

## 2019-06-03 ENCOUNTER — APPOINTMENT (OUTPATIENT)
Dept: PULMONOLOGY | Facility: CLINIC | Age: 74
End: 2019-06-03
Payer: MEDICARE

## 2019-06-03 ENCOUNTER — NON-APPOINTMENT (OUTPATIENT)
Age: 74
End: 2019-06-03

## 2019-06-03 VITALS
OXYGEN SATURATION: 95 % | DIASTOLIC BLOOD PRESSURE: 60 MMHG | HEART RATE: 89 BPM | BODY MASS INDEX: 16.56 KG/M2 | SYSTOLIC BLOOD PRESSURE: 110 MMHG | RESPIRATION RATE: 17 BRPM | HEIGHT: 64 IN | WEIGHT: 97 LBS

## 2019-06-03 DIAGNOSIS — J30.9 ALLERGIC RHINITIS, UNSPECIFIED: ICD-10-CM

## 2019-06-03 DIAGNOSIS — G47.33 OBSTRUCTIVE SLEEP APNEA (ADULT) (PEDIATRIC): ICD-10-CM

## 2019-06-03 DIAGNOSIS — K21.9 GASTRO-ESOPHAGEAL REFLUX DISEASE W/OUT ESOPHAGITIS: ICD-10-CM

## 2019-06-03 DIAGNOSIS — Z72.820 SLEEP DEPRIVATION: ICD-10-CM

## 2019-06-03 DIAGNOSIS — R91.8 OTHER NONSPECIFIC ABNORMAL FINDING OF LUNG FIELD: ICD-10-CM

## 2019-06-03 DIAGNOSIS — J45.909 UNSPECIFIED ASTHMA, UNCOMPLICATED: ICD-10-CM

## 2019-06-03 DIAGNOSIS — R06.02 SHORTNESS OF BREATH: ICD-10-CM

## 2019-06-03 PROCEDURE — 95012 NITRIC OXIDE EXP GAS DETER: CPT

## 2019-06-03 PROCEDURE — 99214 OFFICE O/P EST MOD 30 MIN: CPT | Mod: 25

## 2019-06-03 PROCEDURE — 94010 BREATHING CAPACITY TEST: CPT

## 2019-06-03 NOTE — PHYSICAL EXAM
[General Appearance - Well Developed] : well developed [Normal Appearance] : normal appearance [General Appearance - Well Nourished] : well nourished [Well Groomed] : well groomed [General Appearance - In No Acute Distress] : no acute distress [No Deformities] : no deformities [Eyelids - No Xanthelasma] : the eyelids demonstrated no xanthelasmas [Normal Conjunctiva] : the conjunctiva exhibited no abnormalities [II] : II [Normal Oropharynx] : normal oropharynx [Neck Appearance] : the appearance of the neck was normal [Neck Cervical Mass (___cm)] : no neck mass was observed [Jugular Venous Distention Increased] : there was no jugular-venous distention [Thyroid Diffuse Enlargement] : the thyroid was not enlarged [Thyroid Nodule] : there were no palpable thyroid nodules [Heart Rate And Rhythm] : heart rate and rhythm were normal [Heart Sounds] : normal S1 and S2 [Murmurs] : no murmurs present [Exaggerated Use Of Accessory Muscles For Inspiration] : no accessory muscle use [Respiration, Rhythm And Depth] : normal respiratory rhythm and effort [Auscultation Breath Sounds / Voice Sounds] : lungs were clear to auscultation bilaterally [Abdomen Tenderness] : non-tender [Abdomen Soft] : soft [Abdomen Mass (___ Cm)] : no abdominal mass palpated [Abnormal Walk] : normal gait [Gait - Sufficient For Exercise Testing] : the gait was sufficient for exercise testing [Cyanosis, Localized] : no localized cyanosis [Nail Clubbing] : no clubbing of the fingernails [Petechial Hemorrhages (___cm)] : no petechial hemorrhages [Skin Color & Pigmentation] : normal skin color and pigmentation [] : no rash [No Venous Stasis] : no venous stasis [Skin Lesions] : no skin lesions [No Skin Ulcers] : no skin ulcer [No Xanthoma] : no  xanthoma was observed [Deep Tendon Reflexes (DTR)] : deep tendon reflexes were 2+ and symmetric [Sensation] : the sensory exam was normal to light touch and pinprick [No Focal Deficits] : no focal deficits [Oriented To Time, Place, And Person] : oriented to person, place, and time [Impaired Insight] : insight and judgment were intact [Affect] : the affect was normal [FreeTextEntry1] : I:E ratio 1:3; clear

## 2019-06-03 NOTE — ASSESSMENT
[FreeTextEntry1] : Ms. Rodriguez is a 72 year old female with a history of asthma, allergies, GERD, poor sleep and abnormal CT presenting under stress due to moving to [place] \par \par Her SOB is due to: \par -CAD or arrhythmias (she is to see her cardiologist) \par -asthma (active )\par -anemia \par - Anxiety \par \par problem 1: asthma (?active)\par -continue Nebulizer with Budesonide 0.5% BID, Q6H\par -continue Advair 500 1 puff BID \par -followed by Spiriva 1 inhalation QD\par -continue Qvar 80 2 puffs BID prn \par -continue to use Singulair 10 mg BID\par \par -Asthma is  believed to be caused by inherited (genetic) and environmental factor, but its exact cause is unknown. Asthma may be triggered by allergens, lung infections, or irritants in the air. Asthma triggers are different for each person \par -Inhaler technique reviewed as well as oral hygiene techniques reviewed with patient. Avoidance of cold air, extremes of temperature, rescue inhaler should be used before exercise. Order of medication reviewed with patient. Recommended use of a cool mist humidifier in the bedroom. \par - Information sheet given to the patient to be reviewed, this medication is never to be used without\par consulting the prescribing physician. Proper dietary restraint is necessary specifically salt containing\par foods, if any reaction may occur should be reported.  \par \par problem 2: allergy sinus\par -ENT f/u with Dr. Otero\par -continue to use Zyrtec 10 mg before bed\par -continue to use nasal saline\par -continue Astelin 0.15% at 1 sniff/nostril BID \par -Environmental measures for allergies were encouraged including mattress and pillow cover, air purifier, and environmental controls. \par \par problem 3: GERD\par -continue to use Zantac 150 mg BID\par -recommended to use Organic Apple Cider Vinegar Tablets \par -recommended to continue to follow up with gastroenterologists - Dr. Wes Brar & Dr. ARTURO Swanson\par \par -Rule of 2s: avoid eating too much, eating too late, eating too spicy, eating two hours before bed\par -Things to avoid including overeating, spicy foods, tight clothing, eating within three hours of bed, this list is not all inclusive. \par -For treatment of reflux, possible options discussed including diet control, H2 blockers, PPIs, as well as coating motility agents discussed as treatment options. Timing of meals and proximity of last meal to sleep were discussed. If symptoms persist, a formal gastrointestinal evaluation is needed. \par \par problem 4: low vitamin D level\par -continue to use supplemental vitamin D\par - Has been associated with asthma exacerbations and increased allergic symptoms. The goal based on recent information is maintaining levels between 50-70 and low normal is 30. Recommended 50,000 units every two weeks to once a month depending on the level. \par \par problem 5: poor sleep and leg pains \par -she is being recommended to try MyoCalm PM \par \par problem 6: history of an abnormal chest CT (nodule) - new\par -follow up chest CT now (next 6/19)- due \par \par problem 7: cardiac component\par -continue to f/u with Dr. Swanson\par -pending stress echocardiogram\par \par Problem 8: r/o OSAS\par -she is to have a home sleep study to r/o OSAS based on her fatigue, EDS, snoring, and elevated mallampati class\par -recommended Sleep Guard by nedra \par - -Discussed the risks/associations with coronary artery disease, atrial fibrillation, arrhythmia, memory loss, issues with concentration, stroke risk, hypertension, nocturia, chronic reflux/Dumont’s esophagus some but not all inclusive. Treatment options discussed including CPAP/BiPAP machine, oral appliance, ProVent therapy, Oxy-Aid by Respitec, new technologies, or positional sleep. \par \par Problem 9: ?Esophageal Dysmotility\par - Recommended to follow up with a esophageal motility specialist in [place] (Dr. Eduardo Ortiz or Dr. Frankie Maddox)\par \par problem 9: health maintenance \par -recommended IBS Guard \par -recommended yearly flu shot after October 15\par -recommended strep pneumonia vaccines: Prevnar-13 vaccine, followed by Pneumo vaccine 23 one year following\par -recommended early intervention for URIs\par -recommended regular osteoporosis evaluations\par -recommended early dermatological evaluations\par -recommended after the age of 50 to the age of 70, colonoscopy every 5 years \par \par F/U in 3-4 months\par She is encouraged to call with any changes, concerns, or questions

## 2019-06-03 NOTE — PROCEDURE
[FreeTextEntry1] : PFT- spi reveals normal flows; FEV1 was 1.94L which is 90% of predicted; normal flow volume loop \par \par FENO was 5; a normal value being less than 25\par Fractional exhaled nitric oxide (FENO) is regarded as a simple, noninvasive method for assessing eosinophilic airway inflammation. Produced by a variety of cells within the lung, nitric oxide (NO) concentrations are generally low in healthy individuals. However, high concentrations of NO appear to be involved in nonspecific host defense mechanisms and chronic inflammatory diseases such as asthma. The American Thoracic Society (ATS) therefore has recommended using FENO to aid in the diagnosis and monitoring of eosinophilic airway inflammation and asthma, and for identifying steroid responsive individuals whose chronic respiratory symptoms may be caused by airway inflammation. \par \par Chest CT (6/3/2019) revealed indistinct 1.1 cm groundglass nodule within the posterior basilar right lower lobe is new from prior, most likely inflammatory. Recommend follow-up in 6 months to determine stability. New 0.2 cm solid nodule within the right middle lobe. Recommend attention on follow-up.

## 2019-06-03 NOTE — ADDENDUM
[FreeTextEntry1] : Documented by Lavon Tobin acting as a scribe for Dr. Ady Emmanuel on 6/3/2019\par \par All medical record entries made by the Scribe were at my, Dr. Ady Emmanuel's, direction and personally dictated by me on 6/3/2019. I have reviewed the chart and agree that the record accurately reflects my personal performance of the history, physical exam, assessment and plan. I have also personally directed, reviewed, and agree with the discharge instructions. \par \par \par \par \par

## 2019-06-04 ENCOUNTER — MEDICATION RENEWAL (OUTPATIENT)
Age: 74
End: 2019-06-04

## 2019-06-04 ENCOUNTER — OUTPATIENT (OUTPATIENT)
Dept: OUTPATIENT SERVICES | Facility: HOSPITAL | Age: 74
LOS: 1 days | End: 2019-06-04
Payer: MEDICARE

## 2019-06-04 ENCOUNTER — APPOINTMENT (OUTPATIENT)
Dept: CT IMAGING | Facility: CLINIC | Age: 74
End: 2019-06-04
Payer: MEDICARE

## 2019-06-04 DIAGNOSIS — R91.8 OTHER NONSPECIFIC ABNORMAL FINDING OF LUNG FIELD: ICD-10-CM

## 2019-06-04 PROCEDURE — 71250 CT THORAX DX C-: CPT | Mod: 26

## 2019-06-04 PROCEDURE — 71250 CT THORAX DX C-: CPT

## 2019-06-04 RX ORDER — FLUTICASONE PROPIONATE AND SALMETEROL 500; 50 UG/1; UG/1
500-50 POWDER RESPIRATORY (INHALATION)
Qty: 3 | Refills: 1 | Status: ACTIVE | COMMUNITY
Start: 2017-10-16 | End: 1900-01-01

## 2019-10-08 ENCOUNTER — RESULT REVIEW (OUTPATIENT)
Age: 74
End: 2019-10-08

## 2020-03-23 NOTE — HISTORY OF PRESENT ILLNESS
[FreeTextEntry1] : Ms. Rodriguez is a 73 year old female with a history of abnormal lung CT, allergic rhinitis, asthma, PINTO, GERD, lung nodules, ESTELLE, poor sleep and SOB presenting to the office today for a follow up visit. Her chief complaint is SOB\par - She comes in stating that her asthma has been plaguing her. \par - She states that she becomes severely SOB upon minimal exertion \par - She describes not being able to speak clearly on the phone due to SOB \par - She reports chest pressure \par - She sometimes has dizziness and dysphagia\par - Her sleep is poor secondary to stress, despite taking sleeping medication\par - She states that she is not sleeping enough (5 hours) \par - Hr  states that she does not snore on a constant basis. \par - Her weight is stable \par - She has not been using her nebulizer due to lack of time \par - She does not feel that her asthma is controlled. \par - She states that her breathing is improved when she lies down and rests\par - She denies any headaches, nausea, vomiting, fever, chills, sweats, chest pain, palpitations, coughing, wheezing, diarrhea, constipation, dysphagia, myalgias, dizziness, leg swelling, leg pain, itchy eyes, itchy ears, heartburn, reflux, or sour taste in the mouth. 
psychosis

## 2020-12-31 PROBLEM — K63.89 SMALL INTESTINAL BACTERIAL OVERGROWTH: Status: ACTIVE | Noted: 2019-05-09

## 2021-10-16 NOTE — ASSESSMENT
[FreeTextEntry1] : Ms. Rodriguez is a 72 year old female with a history of asthma, allergies, GERD, poor sleep and abnormal CT presenting to the office with cough for 1 month, chest pressure, and nasal symptoms\par \par Her SOB is due to: \par -CAD or arrhythmias (she is to see her cardiologist) \par -asthma (active )\par -anemia \par \par problem 1: chest pressure- consistent with active asthma\par -add Prednisone 20mg x 7 days, followed by 10mg for 7 days \par Information sheet given to the patient to be reviewed, this medication is never to be used without consulting the prescribing physician. Proper dietary restraint is necessary specifically salt containing foods, if any reaction may occur should be reported. \par \par -continue Nebulizer with Budesonide 0.5% BID, Q6H - treatment given today in office\par -continue Advair 500 1 puff BID \par -followed by Spiriva 1 inhalation QD\par -continue Qvar 80 2 puffs BID prn \par -continue to use Singulair 10 mg BID\par \par -Asthma is  believed to be caused by inherited (genetic) and environmental factor, but its exact cause is unknown. Asthma may be triggered by allergens, lung infections, or irritants in the air. Asthma triggers are different for each person \par -Inhaler technique reviewed as well as oral hygiene techniques reviewed with patient. Avoidance of cold air, extremes of temperature, rescue inhaler should be used before exercise. Order of medication reviewed with patient. Recommended use of a cool mist humidifier in the bedroom. \par - Information sheet given to the patient to be reviewed, this medication is never to be used without\par consulting the prescribing physician. Proper dietary restraint is necessary specifically salt containing\par foods, if any reaction may occur should be reported.  \par \par problem 2: allergies and sinuses\par -ENT f/u with Dr. Otero\par -continue to use Zyrtec 10 mg before bed\par -continue to use nasal saline\par -continue Astelin 0.15% at 1 sniff/nostril BID \par -Appointment with Dr. Otero\par -Environmental measures for allergies were encouraged including mattress and pillow cover, air purifier, and environmental controls. \par \par problem 3: GERD\par -continue to use Zantac 150 mg BID\par -Rule of 2s: avoid eating too much, eating too late, eating too spicy, eating two hours before bed\par -Things to avoid including overeating, spicy foods, tight clothing, eating within three hours of bed, this list is not all inclusive. \par -For treatment of reflux, possible options discussed including diet control, H2 blockers, PPIs, as well as coating motility agents discussed as treatment options. Timing of meals and proximity of last meal to sleep were discussed. If symptoms persist, a formal gastrointestinal evaluation is needed. \par \par problem 4: low vitamin D level\par -continue to use supplemental vitamin D\par - Has been associated with asthma exacerbations and increased allergic symptoms. The goal based on recent information is maintaining levels between 50-70 and low normal is 30. Recommended 50,000 units every two weeks to once a month depending on the level. \par \par problem 5: poor sleep and leg pains \par -she is being recommended to try MyoCalm PM \par \par problem 6: history of an abnormal chest CT (nodule)\par -follow up chest CT now (next 06.2019)\par \par problem 7: cardiac component\par -continue to f/u with Dr. Swanson\par -pending stress echocardiogram\par \par Problem 8: r/o OSAS\par -she is to have a home sleep study to r/o OSAS based on her fatigue, EDS, snoring, and elevated mallampati class\par -recommended Sleep Guard by nedra \par - -Discussed the risks/associations with coronary artery disease, atrial fibrillation, arrhythmia, memory loss, issues with concentration, stroke risk, hypertension, nocturia, chronic reflux/Dumont’s esophagus some but not all inclusive. Treatment options discussed including CPAP/BiPAP machine, oral appliance, ProVent therapy, Oxy-Aid by Respitec, new technologies, or positional sleep. \par \par problem 9: health maintenance \par -recommended IBS Guard \par -recommended yearly flu shot after October 15\par -recommended strep pneumonia vaccines: Prevnar-13 vaccine, followed by Pneumo vaccine 23 one year following\par -recommended early intervention for URIs\par -recommended regular osteoporosis evaluations\par -recommended early dermatological evaluations\par -recommended after the age of 50 to the age of 70, colonoscopy every 5 years \par \par F/U in 3-4 months\par She is encouraged to call with any changes, concerns, or questions  Implemented All Universal Safety Interventions:  [place] to call system. Call bell, personal items and telephone within reach. Instruct patient to call for assistance. Room bathroom lighting operational. Non-slip footwear when patient is off stretcher. Physically safe environment: no spills, clutter or unnecessary equipment. Stretcher in lowest position, wheels locked, appropriate side rails in place.

## 2022-08-02 NOTE — HISTORY OF PRESENT ILLNESS
Grandmother states pt needs well visit before she can start , wondering if she can be squeezed in.  Please advise [FreeTextEntry1] : 74 yo F pmh asthma, anxiety/depression, ? malnutrition who was referred by AYESHA AVILES for second opinion for multiple GI Issues.  The patient is moving to [place] in 3-4 weeks.  She will be unable to do any recommended testing prior to leaving but is curious about what other evaluations she can have and/or consider.  She has multiple GI issues at this time and has some difficulty localizing which are the most bothersome.\par \par Major Current Symptoms:\par \par Dysphagia:\par Slow transit while swallowing \par Sensation of something/food sitting in chest - with liquids and solids\par Feels that it does not go down easily\par No transfer dysphagia or choking while swallowing\par No clear heartburn, regurgitation\par Post prandially with abdominal discomfort\par Chest discomfort with above, but not overt pain separate of this\par She has multiple dietary limitations that are unclear (for example - says she only subsists on pizza because that is the only thing that would go down)\par \par * She has had multiple EGDs, BaS, and E-mano in past that were negative.  Do not have access to esophageal biopsies to confirm no EoE at this time, but they are reported to be normal\par ** Has not tried any TCA, SSRI, alternative therapies (hypnosis, etc) for this sensation\par \par Constipation:\par Controlled with Miralax Daily\par No current straining, but has had in the past\par No blood in stool, but has had in the past\par She does report occasional difficult evacuating\par She does report association with bloating and distension\par Unclear if there is truly any abdominal pain\par There is fecal incontinence in setting of low sphincter pressures (had ARM) - which is what limits the medications she takes\par \par * She has had colonoscopy, but no motility study.  ARM as noted\par \par Anxiety:\par Severe with depression but is on No Medications and declines interventions at this time\par She is willing to consider this may worsen the above symptoms.  Her  believes it is\par It has gotten worse as she contemplates move to [facility]\par She also reports that while her weight is stable, it has always run on lower side.  She makes some references to 'tries to stay thin' but she considers this normal\par \par ----------------------------------------------------------------------\par Previous Workup:\par EGD/Colonoscopy 2019 -  reportedly\par Has not had VCE\par \par BaS 5/2019\par The patient was able to swallow 13 mm barium tablet without difficulty which \par passed through the gastroesophageal junction unimpeded. \par \par IMPRESSION: \par Tertiary contractions. \par Mild gastroesophageal reflux. \par \par \par CT Chest 3/2019:\par IMPRESSION: \par \par 1. Indistinct 1.1 cm groundglass nodule within the posterior basilar right \par lower lobe (series 3 image 87) is new from prior, most likely inflammatory. \par Recommend follow-up in 6 months to determine stability. \par \par 2. New 0.2 cm solid nodule within the right middle lobe (series 3 image \par 19). Recommend attention on follow-up. \par Breath Test 2016 - Normal\par \par E-mano 2015 - Leisa HUFF\par Elevated Basal LES pressure, ? sliding HH\par No evidence of dysmotility\par \par ARM 2014 - Liesa HUFF\par Low resting anal sphincter pressure and low squeeze pressure\par Normal RAIR\par Diminished urge to defecate with balloon distension

## 2023-08-29 ENCOUNTER — APPOINTMENT (OUTPATIENT)
Dept: PULMONOLOGY | Facility: CLINIC | Age: 78
End: 2023-08-29